# Patient Record
(demographics unavailable — no encounter records)

---

## 2025-01-03 NOTE — ASSESSMENT
[FreeTextEntry1] : 37-year-old female presents for initial evaluation; breast exam today is without clinically significant findings.  There are no discrete palpable abnormalities in her right breast at 11:00, however she does have very dense fibrocystic tissue which likely accounts for the physical exam finding from prior.   Reviewed the results of her recent mammogram and ultrasound in detail, follow-up diagnostic right mammogram and targeted ultrasound is recommended.  The patient will call [facility] to get that scheduled for July, we will plan on seeing the patient after that for reevaluation.   Encouraged her to apply a small amount of Aquaphor to her bilateral nipples & areolas daily after showering, which should aid in the itching sensation she is experiencing.  I explained to the patient that she has heterogeneously dense tissue, therefore supplemental ultrasounds are recommended at the time of any screening mammography moving forward.  Discussed the importance of breast self awareness. Encouraged the patient to become familiar with her tissue so as to be aware of any changes from her baseline.  All questions were answered; the patient verbalized understanding and is in agreement with the plan.

## 2025-01-03 NOTE — PHYSICAL EXAM
[Normocephalic] : normocephalic [No Supraclavicular Adenopathy] : no supraclavicular adenopathy [No Cervical Adenopathy] : no cervical adenopathy [Examined in the supine and seated position] : examined in the supine and seated position [Symmetrical] : symmetrical [No dominant masses] : no dominant masses in right breast  [No dominant masses] : no dominant masses left breast [No Nipple Retraction] : no left nipple retraction [No Nipple Discharge] : no left nipple discharge [No Axillary Lymphadenopathy] : no left axillary lymphadenopathy [No Edema] : no edema [No Rashes] : no rashes [No Ulceration] : no ulceration [Breast Nipple Inversion] : nipples not inverted [Breast Nipple Retraction] : nipples not retracted [Breast Nipple Flattening] : nipples not flattened [Breast Nipple Fissures] : nipples not fissured [de-identified] : Fibrocystic tissue to the upper outer quadrants R > L

## 2025-01-03 NOTE — PAST MEDICAL HISTORY
[Menstruating] : The patient is menstruating [Menarche Age ____] : age at menarche was [unfilled] [Definite ___ (Date)] : the last menstrual period was [unfilled] [Normal Amount/Duration] : it was of a normal amount and duration [Regular Cycle Intervals] : have been regular [Total Preg ___] : G[unfilled] [History of Hormone Replacement Treatment] : has no history of hormone replacement treatment [FreeTextEntry6] : Na [FreeTextEntry7] : Na [FreeTextEntry8] : Na

## 2025-01-03 NOTE — DATA REVIEWED
[FreeTextEntry1] : 12/31/24 ([facility]) b/l diag mammo and US: Heterogeneously dense.  Probably benign asymmetry noted in the right breast is an incidental finding of a probably benign  0.4cm mass, likely a complicated cyst in the right breast, 6 with follow-up diagnostic right breast mammogram and targeted right ultrasound is recommended.  BI-RADS 3

## 2025-01-03 NOTE — HISTORY OF PRESENT ILLNESS
[FreeTextEntry1] : 37-year-old female referred by Dr. Carl Sullivan, presents for a palpable area of nodularity in the right breast 11:00, palpated by her GYN at her annual appointment.  She reports intermittent, self-limiting, breast pain prior to her menstrual cycle.  Patient underwent bilateral diagnostic imaging at [facility] earlier this week, which did not identify imaging correlate for the reported palpable finding, however there were two incidental findings in the right breast for which 6-month follow-up diagnostic mammogram and ultrasound was recommended.  Pt states that she does not have any palpable masses, skin lesions/changes, nipple discharge.  She reports pruritus to her bilateral nipples, which started recently, denies skin changes  ALIA lifetime risk is 15.8% she has no family history of breast or ovarian cancer